# Patient Record
Sex: FEMALE | Race: WHITE
[De-identification: names, ages, dates, MRNs, and addresses within clinical notes are randomized per-mention and may not be internally consistent; named-entity substitution may affect disease eponyms.]

---

## 2020-03-02 ENCOUNTER — HOSPITAL ENCOUNTER (EMERGENCY)
Dept: HOSPITAL 11 - JP.ED | Age: 45
Discharge: HOME | End: 2020-03-02
Payer: COMMERCIAL

## 2020-03-02 VITALS — DIASTOLIC BLOOD PRESSURE: 92 MMHG | SYSTOLIC BLOOD PRESSURE: 147 MMHG | HEART RATE: 71 BPM

## 2020-03-02 DIAGNOSIS — Z88.0: ICD-10-CM

## 2020-03-02 DIAGNOSIS — E86.0: Primary | ICD-10-CM

## 2020-03-02 DIAGNOSIS — R61: ICD-10-CM

## 2020-03-02 DIAGNOSIS — Z88.1: ICD-10-CM

## 2020-03-02 PROCEDURE — 93005 ELECTROCARDIOGRAM TRACING: CPT

## 2020-03-02 PROCEDURE — 81001 URINALYSIS AUTO W/SCOPE: CPT

## 2020-03-02 PROCEDURE — 85025 COMPLETE CBC W/AUTO DIFF WBC: CPT

## 2020-03-02 PROCEDURE — 36415 COLL VENOUS BLD VENIPUNCTURE: CPT

## 2020-03-02 PROCEDURE — 84484 ASSAY OF TROPONIN QUANT: CPT

## 2020-03-02 PROCEDURE — 87086 URINE CULTURE/COLONY COUNT: CPT

## 2020-03-02 PROCEDURE — 99284 EMERGENCY DEPT VISIT MOD MDM: CPT

## 2020-03-02 PROCEDURE — 80053 COMPREHEN METABOLIC PANEL: CPT

## 2020-03-02 PROCEDURE — 70450 CT HEAD/BRAIN W/O DYE: CPT

## 2020-03-02 PROCEDURE — 96360 HYDRATION IV INFUSION INIT: CPT

## 2020-03-02 NOTE — EDM.PDOC
ED HPI GENERAL MEDICAL PROBLEM





- General


Chief Complaint: Neurological Problem


Stated Complaint: DIZZY


Time Seen by Provider: 03/02/20 08:14


Source of Information: Reports: Patient


History Limitations: Reports: No Limitations





- History of Present Illness


INITIAL COMMENTS - FREE TEXT/NARRATIVE: 





pt arrived with a history of geting up this am and feeling like she was going 

to pass out.  She did not feel like the room was whirling. She has had vertigo 

in the past and this did seem different. 


Onset: Today, Sudden, Other (pt got up with the symptoms. )


Duration: Hour(s):


Location: Reports: Head, Other (pt has no visual symptoms.  she has a low grade 

headache/ )


Associated Symptoms: Reports: Diaphoresis, Weakness, Other (pt felt like she 

was going to pass out. )





- Related Data


 Allergies











Allergy/AdvReac Type Severity Reaction Status Date / Time


 


amoxicillin Allergy  Cannot Verified 03/05/20 08:41





   Remember  


 


erythromycin lactobionate Allergy  Rash Verified 03/05/20 08:41





[From Erythrocin]     


 


Penicillins Allergy  Rash Verified 03/05/20 08:41











Home Meds: 


 Home Meds





Ibuprofen [Motrin] 600 mg PO ASDIRECTED PRN 04/27/15 [History]


Meclizine HCl 1 tab PO ASDIRECTED PRN 03/02/20 [History]











ED ROS GENERAL





- Review of Systems


Review Of Systems: See Below


Constitutional: Reports: Weakness, Diaphoresis


HEENT: Reports: No Symptoms


Respiratory: Reports: No Symptoms


Cardiovascular: Reports: Other (pt has not had chest pain. )


Endocrine: Reports: No Symptoms


GI/Abdominal: Reports: No Symptoms


: Reports: No Symptoms


Musculoskeletal: Reports: No Symptoms


Skin: Reports: No Symptoms


Neurological: Reports: Dizziness, Other (pt had the sensation of passing out. )





ED EXAM, NEURO





- Physical Exam


Exam: See Below


Text/Narrative:: 





pt arrived feeling  weak and like she was going to pass out.  She was very 

diaophoretic at the time. She did not have chest pain. 


Exam Limited By: No Limitations


General Appearance: Alert, Anxious, Other (pale appearing.  pupils are equal 

and reactive. )


Ears: Normal TMs


Nose: Normal Inspection


Throat/Mouth: Normal Inspection


Head Exam: Atraumatic


Neck: Normal Inspection


Respiratory/Chest: No Respiratory Distress


Cardiovascular: Regular Rate, Rhythm


GI/Abdominal: Soft, Non-Tender


 (Female) Exam: Deferred


Rectal (Female) Exam: Deferred


Neurological: Alert


Back Exam: Normal Inspection


Extremities: Normal Inspection


Psychiatric: Normal Affect





Course





- Vital Signs


Last Recorded V/S: 


 Last Vital Signs











Temp  36.2 C   03/02/20 08:09


 


Pulse  71   03/02/20 08:09


 


Resp  16   03/02/20 08:09


 


BP  147/92 H  03/02/20 08:09


 


Pulse Ox  99   03/02/20 08:09








 





Orthostatic Blood Pressure [     129/88


Standing]                        


Orthostatic Blood Pressure [     143/92


Sitting]                         


Orthostatic Blood Pressure [     140/82


Supine]                          











- Orders/Labs/Meds


Labs: 


 Laboratory Tests











  03/02/20 03/02/20 03/02/20 Range/Units





  08:19 08:19 08:24 


 


WBC  7.7    (4.5-11.0)  K/uL


 


RBC  4.46    (3.30-5.50)  M/uL


 


Hgb  14.3    (12.0-15.0)  g/dL


 


Hct  43.9    (36.0-48.0)  %


 


MCV  98    (80-98)  fL


 


MCH  32 H    (27-31)  pg


 


MCHC  33    (32-36)  %


 


Plt Count  261    (150-400)  K/uL


 


Neut % (Auto)  56    (36-66)  %


 


Lymph % (Auto)  33    (24-44)  %


 


Mono % (Auto)  8 H    (2-6)  %


 


Eos % (Auto)  2    (2-4)  %


 


Baso % (Auto)  1    (0-1)  %


 


Sodium   138 L   (140-148)  mmol/L


 


Potassium   4.4   (3.6-5.2)  mmol/L


 


Chloride   105   (100-108)  mmol/L


 


Carbon Dioxide   23   (21-32)  mmol/L


 


Anion Gap   14.4 H   (5.0-14.0)  mmol/L


 


BUN   17   (7-18)  mg/dL


 


Creatinine   1.0   (0.6-1.0)  mg/dL


 


Est Cr Clr Drug Dosing   71.16   mL/min


 


Estimated GFR (MDRD)   > 60   (>60)  


 


Glucose   86   ()  mg/dL


 


Calcium   8.4 L   (8.5-10.1)  mg/dL


 


Total Bilirubin   0.3   (0.2-1.0)  mg/dL


 


AST   19   (15-37)  U/L


 


ALT   22   (12-78)  U/L


 


Alkaline Phosphatase   76   ()  U/L


 


Troponin I     (0.000-0.056)  ng/mL


 


Total Protein   7.0   (6.4-8.2)  g/dL


 


Albumin   3.6   (3.4-5.0)  g/dL


 


Globulin   3.4   (2.3-3.5)  g/dL


 


Albumin/Globulin Ratio   1.1 L   (1.2-2.2)  


 


Urine Color    Yellow  (YELLOW)  


 


Urine Appearance    Clear  (CLEAR)  


 


Urine pH    5.5  (5.0-8.0)  


 


Ur Specific Gravity    1.025  (1.008-1.030)  


 


Urine Protein    Negative  (NEGATIVE)  mg/dL


 


Urine Glucose (UA)    Negative  (NEGATIVE)  mg/dL


 


Urine Ketones    Negative  (NEGATIVE)  mg/dL


 


Urine Occult Blood    Small H  (NEGATIVE)  


 


Urine Nitrite    Negative  (NEGATIVE)  


 


Urine Bilirubin    Negative  (NEGATIVE)  


 


Urine Urobilinogen    0.2  (0.2-1.0)  EU/dL


 


Ur Leukocyte Esterase    Negative  (NEGATIVE)  


 


Urine RBC    0-5  (0-5)  


 


Urine WBC    Not seen  (0-5)  


 


Ur Epithelial Cells    Moderate  


 


Amorphous Sediment    Not seen  


 


Urine Bacteria    Moderate  


 


Urine Mucus    Not seen  














  03/02/20 Range/Units





  09:03 


 


WBC   (4.5-11.0)  K/uL


 


RBC   (3.30-5.50)  M/uL


 


Hgb   (12.0-15.0)  g/dL


 


Hct   (36.0-48.0)  %


 


MCV   (80-98)  fL


 


MCH   (27-31)  pg


 


MCHC   (32-36)  %


 


Plt Count   (150-400)  K/uL


 


Neut % (Auto)   (36-66)  %


 


Lymph % (Auto)   (24-44)  %


 


Mono % (Auto)   (2-6)  %


 


Eos % (Auto)   (2-4)  %


 


Baso % (Auto)   (0-1)  %


 


Sodium   (140-148)  mmol/L


 


Potassium   (3.6-5.2)  mmol/L


 


Chloride   (100-108)  mmol/L


 


Carbon Dioxide   (21-32)  mmol/L


 


Anion Gap   (5.0-14.0)  mmol/L


 


BUN   (7-18)  mg/dL


 


Creatinine   (0.6-1.0)  mg/dL


 


Est Cr Clr Drug Dosing   mL/min


 


Estimated GFR (MDRD)   (>60)  


 


Glucose   ()  mg/dL


 


Calcium   (8.5-10.1)  mg/dL


 


Total Bilirubin   (0.2-1.0)  mg/dL


 


AST   (15-37)  U/L


 


ALT   (12-78)  U/L


 


Alkaline Phosphatase   ()  U/L


 


Troponin I  < 0.017  (0.000-0.056)  ng/mL


 


Total Protein   (6.4-8.2)  g/dL


 


Albumin   (3.4-5.0)  g/dL


 


Globulin   (2.3-3.5)  g/dL


 


Albumin/Globulin Ratio   (1.2-2.2)  


 


Urine Color   (YELLOW)  


 


Urine Appearance   (CLEAR)  


 


Urine pH   (5.0-8.0)  


 


Ur Specific Gravity   (1.008-1.030)  


 


Urine Protein   (NEGATIVE)  mg/dL


 


Urine Glucose (UA)   (NEGATIVE)  mg/dL


 


Urine Ketones   (NEGATIVE)  mg/dL


 


Urine Occult Blood   (NEGATIVE)  


 


Urine Nitrite   (NEGATIVE)  


 


Urine Bilirubin   (NEGATIVE)  


 


Urine Urobilinogen   (0.2-1.0)  EU/dL


 


Ur Leukocyte Esterase   (NEGATIVE)  


 


Urine RBC   (0-5)  


 


Urine WBC   (0-5)  


 


Ur Epithelial Cells   


 


Amorphous Sediment   


 


Urine Bacteria   


 


Urine Mucus   











Meds: 


Medications














Discontinued Medications














Generic Name Dose Route Start Last Admin





  Trade Name Freq  PRN Reason Stop Dose Admin


 


Sodium Chloride  1,000 mls @ 999 mls/hr  03/02/20 09:00  03/02/20 09:51





  Normal Saline  IV   999 mls/hr





  ASDIRECTED [facility]





     





     





     





     














- Re-Assessments/Exams


Free Text/Narrative Re-Assessment/Exam: 





03/02/20 10:20


pt had an episode this am where she felt like she was going to pass out. She 

does smoke 1/2 pack daily, she has a family history of a father who had a MI in 

the 60s. She states her weekend was relaxing and she ate and drank normally.   

She does appear to have milsd dehydration. When she had the episode she was 

very diaphoretic.  Her trop was normal. Her ekg shows poor R progression 

anteriorly. 


03/02/20 10:23








Departure





- Departure


Time of Disposition: 10:24


Disposition: Home, Self-Care 01


Condition: Fair


Clinical Impression: 


 Diaphoresis, Near syncope, Dehydration








- Discharge Information


Instructions:  Near-Syncope, Easy-to-Read, Dehydration, Adult, Easy-to-Read


Referrals: 


Shani Wolf PA-C [Primary Care Provider] - 


Forms:  ED Department Discharge


Care Plan Goals: 


 rtc for a exercise cardiolyte, push fluids.  rtc if further symptoms.  follow 

up with regular  





Sepsis Event Note





- Focused Exam


Date Exam was Performed: 03/05/20


Time Exam was Performed: 19:21

## 2020-03-02 NOTE — CRLCT
INDICATION:



Near syncope.



TECHNIQUE:



The head was scanned from the foramen magnum to the vertex without 

contrast. Brain and bone windows were reviewed. 



Coronal reformatted images were generated.



FINDINGS:



The ventricles and sulci are normal. No mass effect or midline shift. No 

intraparenchymal hemorrhage or hematoma. No extra-axial fluid collections. 

Good gray-white matter differentiation. Calvarium is intact. Visualized 

mastoid air cells and paranasal sinuses are unremarkable.



IMPRESSION:



No acute intracranial abnormality.



Please note that all CT scans at this facility use dose modulation, 

iterative reconstruction, and/or weight-based dosing when appropriate to 

reduce radiation dose to as low as reasonably achievable.



Dictated by Stevie Brown MD @ Mar  2 2020  9:50AM



Signed by Dr. Stevie Brown @ Mar  2 2020  9:53AM